# Patient Record
Sex: FEMALE | Race: WHITE | NOT HISPANIC OR LATINO | Employment: UNEMPLOYED | URBAN - METROPOLITAN AREA
[De-identification: names, ages, dates, MRNs, and addresses within clinical notes are randomized per-mention and may not be internally consistent; named-entity substitution may affect disease eponyms.]

---

## 2018-04-04 ENCOUNTER — TRANSCRIBE ORDERS (OUTPATIENT)
Dept: PHYSICAL THERAPY | Facility: CLINIC | Age: 38
End: 2018-04-04

## 2018-04-04 ENCOUNTER — EVALUATION (OUTPATIENT)
Dept: PHYSICAL THERAPY | Facility: CLINIC | Age: 38
End: 2018-04-04
Payer: COMMERCIAL

## 2018-04-04 DIAGNOSIS — M54.16 LUMBAR RADICULOPATHY: ICD-10-CM

## 2018-04-04 DIAGNOSIS — M53.3 SACROILIAC JOINT DYSFUNCTION: Primary | ICD-10-CM

## 2018-04-04 PROCEDURE — G8988 SELF CARE GOAL STATUS: HCPCS

## 2018-04-04 PROCEDURE — G8987 SELF CARE CURRENT STATUS: HCPCS

## 2018-04-04 PROCEDURE — 97140 MANUAL THERAPY 1/> REGIONS: CPT

## 2018-04-04 PROCEDURE — 97161 PT EVAL LOW COMPLEX 20 MIN: CPT

## 2018-04-04 RX ORDER — CYCLOBENZAPRINE HCL 10 MG
TABLET ORAL
COMMUNITY

## 2018-04-04 NOTE — LETTER
2018    MD Vandana Foley [phone]    Patient: Fariba Alan   YOB: 1980   Date of Visit: 2018     Encounter Diagnosis     ICD-10-CM    1  Sacroiliac joint dysfunction M53 3    2  Lumbar radiculopathy M54 16        Dear Dr Augusto Horan:    Please review the attached Plan of Care from [facility] recent visit  Please verify that you agree therapy should continue by signing the attached document and sending it back to our office  If you have any questions or concerns, please don't hesitate to call  Sincerely,    Fabby Mina, PT      Referring Provider:      I certify that I have read the below Plan of Care and certify the need for these services furnished under this plan of treatment while under my care  MD Vandana Foley [phone] [id number]  VIA Facsimile: [phone]          PT Evaluation     Today's date: 2018  Patient name: Fariba Alan  : 1980  MRN: [id number]  Referring provider: Brandi De Santiago MD  Dx:   Encounter Diagnosis     ICD-10-CM    1  Sacroiliac joint dysfunction M53 3    2  Lumbar radiculopathy M54 16        Start Time: 1300  Stop Time: 1400  Total time in clinic (min): 60 minutes    Assessment  Impairments: abnormal muscle firing, abnormal or restricted ROM, activity intolerance, impaired physical strength, lacks appropriate home exercise program and pain with function    Assessment details: Fariba Alan is a 44 y o  female who presents with pain, decreased strength, decreased ROM, decreased joint mobility and postural  dysfunction  Due to these impairments, patient has difficulty performing a/iadls, recreational activities and engaging in social activities  Patient's clinical presentation is consistent with their referring diagnosis of Lumbar radiculopathy  (primary encounter diagnosis)     Patient has been educated in home exercise program and plan of care  Patient would benefit from skilled physical therapy services to address their aforementioned functional limitations and progress towards prior level of function and independence with home exercise program    Understanding of Dx/Px/POC: good   Prognosis: good    Goals  Short Term Goals: Target Date 5/4/18  1  Pt will initiate and advance HEP  2  Pt will demonstrate improved standing posture  3  Pt will demonstrate (-) Elys  4  Pt will demonstrate increased AROM in L/S  Long Term Goals: Target Date 5/31/18  1  Pt will demonstrate independence in HEP  2  Pt will report abolished pain  3  Pt will demonstrate full AROM in L/S   4  Pt will demonstrate (-) Moses  Plan  Patient would benefit from: skilled PT  Planned modality interventions: cryotherapy, electrical stimulation/Russian stimulation and thermotherapy: hydrocollator packs  Planned therapy interventions: joint mobilization, manual therapy, patient education, postural training, activity modification, abdominal trunk stabilization, body mechanics training, flexibility, functional ROM exercises, graded exercise, home exercise program, neuromuscular re-education, strengthening, stretching, therapeutic activities, therapeutic exercise, motor coordination training, muscle pump exercises, gait training, balance/weight bearing training, ADL training and breathing training  Frequency: 2x week  Duration in weeks: 6  Treatment plan discussed with: patient        Subjective Evaluation    History of Present Illness  Date of onset: 12/31/2017  Mechanism of injury: Pt reports intermittent "flare ups" in L/S since August 2017  Pt reports original symptoms were managed with physical therapy  Pt reports she was able to continue with HEP from PT to manage symptoms  Pt reports most recent onset of pain in December 2017  Pt reports pain has continued to worsen noting she went to urgent care and chiropractic with minimal relief   Pt reports she sought Dr Arnaud Anguiano most recently where she was referred to OPPT  Recurrent probem    Quality of life: good    Pain  Current pain ratin  At best pain ratin  At worst pain ratin  Location: R L/S b/l hips, b/l thighs   Quality: cramping, tight and needle-like  Relieving factors: medications and rest  Aggravating factors: nothing  Progression: worsening    Social Support  Lives with: young children and spouse    Employment status: not working  Hand dominance: right  Exercise history: none currently   Life stress: mother of 3      Diagnostic Tests  X-ray: abnormal  MRI studies: abnormal  Treatments  Previous treatment: physical therapy, chiropractic and medication  Current treatment: chiropractic and medication  Patient Goals  Patient goals for therapy: decreased pain, increased motion and independence with ADLs/IADLs          Objective     Static Posture   General Observations  Asymmetrical weight bearing and shifted right  Scapulae  Right depressed  Palpation   Left   No palpable tenderness to the gluteus medius and piriformis  Hypertonic in the iliacus, iliopsoas and lumbar paraspinals  Muscle spasm in the erector spinae and iliacus  Tenderness of the quadratus lumborum  Right   No palpable tenderness to the gluteus medius and piriformis  Hypertonic in the erector spinae, iliacus, iliopsoas, lumbar paraspinals and quadratus lumborum  Muscle spasm in the iliacus  Tenderness of the lumbar paraspinals  Tenderness     Lumbar Spine  Tenderness in the spinous process       Active Range of Motion     Additional Active Range of Motion Details  L/S   Flexion decreased by 25%*  Extension decreased by 75%  Side Bending WFLs  b/l  Rotation WFLs b/l     Strength/Myotome Testing     Lumbar   Left   Normal strength  Heel walk: normal  Toe walk: normal    Right   Normal strength  Heel walk: normal  Toe walk: normal    Left Hip   Normal muscle strength  Planes of Motion   Extension: 4    Right Hip   Normal muscle strength  Planes of Motion   Extension: 4    Left Knee   Normal strength    Right Knee   Normal strength    Left Ankle/Foot   Normal strength    Right Ankle/Foot   Normal strength  Great toe extension: 4+    Tests     Lumbar     Left   Positive femoral stretch  Negative passive SLR  Right   Positive femoral stretch  Negative passive SLR  Left Hip   Positive Ely's  Moses: Positive  90/90 SLR: Positive  SLR: Negative  Right Hip   Moses: Positive  90/90 SLR: Positive  SLR: Negative  Additional Tests Details  (+) Long Sit test    Ambulation     Observational Gait   Gait: within functional limits     Functional Assessment   Squat   Left tibial anterior translation beyond toes and right tibial anterior translation beyond toes       Single Leg Stance   Left: 30 seconds  Right: 30 seconds    Comments  Squat b/l heel lift      Flowsheet Rows    Flowsheet Row Most Recent Value   PT/OT G-Codes   Current Score  33   Projected Score  52   FOTO information reviewed  Yes   Assessment Type  Evaluation   G code set  Self-Care   Self Care Current Status ()  CL   Self Care Goal Status ()  CK          Precautions: standard    Daily Treatment Diary     Manual  4/4/18            MET L hip ext 5x:06            STM L/S paraspinals  10 min            L/S taping x2                                          Exercise Diary  4/4/18            TVA 10x2            LTR 10x                                                                                                                                                                                                                                                          Modalities

## 2018-04-04 NOTE — PROGRESS NOTES
PT Evaluation     Today's date: 2018  Patient name: Manish Keita  : 1980  MRN: [id number]  Referring provider: Zach White MD  Dx:   Encounter Diagnosis     ICD-10-CM    1  Sacroiliac joint dysfunction M53 3    2  Lumbar radiculopathy M54 16        Start Time: 1300  Stop Time: 1400  Total time in clinic (min): 60 minutes    Assessment  Impairments: abnormal muscle firing, abnormal or restricted ROM, activity intolerance, impaired physical strength, lacks appropriate home exercise program and pain with function    Assessment details: Manish Keita is a 44 y o  female who presents with pain, decreased strength, decreased ROM, decreased joint mobility and postural  dysfunction  Due to these impairments, patient has difficulty performing a/iadls, recreational activities and engaging in social activities  Patient's clinical presentation is consistent with their referring diagnosis of Lumbar radiculopathy  (primary encounter diagnosis)    Patient has been educated in home exercise program and plan of care  Patient would benefit from skilled physical therapy services to address their aforementioned functional limitations and progress towards prior level of function and independence with home exercise program    Understanding of Dx/Px/POC: good   Prognosis: good    Goals  Short Term Goals: Target Date 18  1  Pt will initiate and advance HEP  2  Pt will demonstrate improved standing posture  3  Pt will demonstrate (-) Elys  4  Pt will demonstrate increased AROM in L/S  Long Term Goals: Target Date 18  1  Pt will demonstrate independence in HEP  2  Pt will report abolished pain  3  Pt will demonstrate full AROM in L/S   4  Pt will demonstrate (-) Moses         Plan  Patient would benefit from: skilled PT  Planned modality interventions: cryotherapy, electrical stimulation/Russian stimulation and thermotherapy: hydrocollator packs  Planned therapy interventions: joint mobilization, manual therapy, patient education, postural training, activity modification, abdominal trunk stabilization, body mechanics training, flexibility, functional ROM exercises, graded exercise, home exercise program, neuromuscular re-education, strengthening, stretching, therapeutic activities, therapeutic exercise, motor coordination training, muscle pump exercises, gait training, balance/weight bearing training, ADL training and breathing training  Frequency: 2x week  Duration in weeks: 6  Treatment plan discussed with: patient        Subjective Evaluation    History of Present Illness  Date of onset: 2017  Mechanism of injury: Pt reports intermittent "flare ups" in L/S since 2017  Pt reports original symptoms were managed with physical therapy  Pt reports she was able to continue with HEP from PT to manage symptoms  Pt reports most recent onset of pain in 2017  Pt reports pain has continued to worsen noting she went to urgent care and chiropractic with minimal relief  Pt reports she sought Dr [address] most recently where she was referred to OPPT     Recurrent probem    Quality of life: good    Pain  Current pain ratin  At best pain ratin  At worst pain ratin  Location: R L/S b/l hips, b/l thighs   Quality: cramping, tight and needle-like  Relieving factors: medications and rest  Aggravating factors: nothing  Progression: worsening    Social Support  Lives with: young children and spouse    Employment status: not working  Hand dominance: right  Exercise history: none currently   Life stress: mother of 3      Diagnostic Tests  X-ray: abnormal  MRI studies: abnormal  Treatments  Previous treatment: physical therapy, chiropractic and medication  Current treatment: chiropractic and medication  Patient Goals  Patient goals for therapy: decreased pain, increased motion and independence with ADLs/IADLs          Objective     Static Posture   General Observations  Asymmetrical weight bearing and shifted right  Scapulae  Right depressed  Palpation   Left   No palpable tenderness to the gluteus medius and piriformis  Hypertonic in the iliacus, iliopsoas and lumbar paraspinals  Muscle spasm in the erector spinae and iliacus  Tenderness of the quadratus lumborum  Right   No palpable tenderness to the gluteus medius and piriformis  Hypertonic in the erector spinae, iliacus, iliopsoas, lumbar paraspinals and quadratus lumborum  Muscle spasm in the iliacus  Tenderness of the lumbar paraspinals  Tenderness     Lumbar Spine  Tenderness in the spinous process  Active Range of Motion     Additional Active Range of Motion Details  L/S   Flexion decreased by 25%*  Extension decreased by 75%  Side Bending WFLs  b/l  Rotation WFLs b/l     Strength/Myotome Testing     Lumbar   Left   Normal strength  Heel walk: normal  Toe walk: normal    Right   Normal strength  Heel walk: normal  Toe walk: normal    Left Hip   Normal muscle strength  Planes of Motion   Extension: 4    Right Hip   Normal muscle strength  Planes of Motion   Extension: 4    Left Knee   Normal strength    Right Knee   Normal strength    Left Ankle/Foot   Normal strength    Right Ankle/Foot   Normal strength  Great toe extension: 4+    Tests     Lumbar     Left   Positive femoral stretch  Negative passive SLR  Right   Positive femoral stretch  Negative passive SLR  Left Hip   Positive Ely's  Moses: Positive  90/90 SLR: Positive  SLR: Negative  Right Hip   Moses: Positive  90/90 SLR: Positive  SLR: Negative  Additional Tests Details  (+) Long Sit test    Ambulation     Observational Gait   Gait: within functional limits     Functional Assessment   Squat   Left tibial anterior translation beyond toes and right tibial anterior translation beyond toes       Single Leg Stance   Left: 30 seconds  Right: 30 seconds    Comments  Squat b/l heel lift      Flowsheet Rows    Flowsheet Row Most Recent Value PT/OT G-Codes   Current Score  33   Projected Score  52   FOTO information reviewed  Yes   Assessment Type  Evaluation   G code set  Self-Care   Self Care Current Status ()  CL   Self Care Goal Status ()  CK          Precautions: standard    Daily Treatment Diary     Manual  4/4/18            MET L hip ext 5x:06            STM L/S paraspinals  10 min            L/S taping x2                                          Exercise Diary  4/4/18            TVA 10x2            LTR 10x                                                                                                                                                                                                                                                          Modalities

## 2018-04-09 ENCOUNTER — OFFICE VISIT (OUTPATIENT)
Dept: PHYSICAL THERAPY | Facility: CLINIC | Age: 38
End: 2018-04-09
Payer: COMMERCIAL

## 2018-04-09 DIAGNOSIS — M54.16 LUMBAR RADICULOPATHY: ICD-10-CM

## 2018-04-09 DIAGNOSIS — M53.3 SACROILIAC JOINT DYSFUNCTION: Primary | ICD-10-CM

## 2018-04-09 PROCEDURE — 97140 MANUAL THERAPY 1/> REGIONS: CPT

## 2018-04-09 PROCEDURE — 97110 THERAPEUTIC EXERCISES: CPT

## 2018-04-11 ENCOUNTER — OFFICE VISIT (OUTPATIENT)
Dept: PHYSICAL THERAPY | Facility: CLINIC | Age: 38
End: 2018-04-11
Payer: COMMERCIAL

## 2018-04-11 DIAGNOSIS — M53.3 SACROILIAC JOINT DYSFUNCTION: ICD-10-CM

## 2018-04-11 DIAGNOSIS — M54.16 LUMBAR RADICULOPATHY: Primary | ICD-10-CM

## 2018-04-11 PROCEDURE — 97110 THERAPEUTIC EXERCISES: CPT

## 2018-04-11 PROCEDURE — 97140 MANUAL THERAPY 1/> REGIONS: CPT

## 2018-04-11 NOTE — PROGRESS NOTES
Daily Note     Today's date: 2018  Patient name: Cele Plummer  : 1980  MRN: [id number]  Referring provider: Maurisio Campbell MD  Dx:   Encounter Diagnoses   Name Primary?  Lumbar radiculopathy Yes    Sacroiliac joint dysfunction        Start Time: 1100  Stop Time: 1205  Total time in clinic (min): 65 minutes    Subjective: Pt reports she reports increased pain since Monday, reports she woke up yesterday with R sided pain, reports muscles feel in spasm  Pt reports she had less help with her kids yesterday, however reports she was trying to be mindful of mechanics  Pain Ratin/10    Objective: See treatment diary below    There Ex  TVA isometric add/abd 10x:10 x 2  LTR 10x  Prone Prop 3 min  TVA alt ER 20x  Diaphragm Breathing     Manual  STM L/S paraspinals  MFR b/l QL  Sacral counter nutation 3x10  PA  T/S  MET R hip ext, L hip flex  Isometric hip abd//abd  Long axis traction  Pain taping on R L/S      Assessment: pt demonstrates high levels of anxiety throughout session,  Frustrated with pain levels  Pt reports LE spasms and UE symptoms  Pt encouraged with relaxation techniques to avoid stress  Pt reports decreased pain post treatment session  Pain Rating: 3/10      Plan: Continue per plan of care  Progress treatment as tolerated

## 2018-04-16 ENCOUNTER — OFFICE VISIT (OUTPATIENT)
Dept: PHYSICAL THERAPY | Facility: CLINIC | Age: 38
End: 2018-04-16
Payer: COMMERCIAL

## 2018-04-16 DIAGNOSIS — M53.3 SACROILIAC JOINT DYSFUNCTION: ICD-10-CM

## 2018-04-16 DIAGNOSIS — M54.16 LUMBAR RADICULOPATHY: Primary | ICD-10-CM

## 2018-04-16 PROCEDURE — 97110 THERAPEUTIC EXERCISES: CPT

## 2018-04-16 PROCEDURE — 97140 MANUAL THERAPY 1/> REGIONS: CPT

## 2018-04-16 NOTE — PROGRESS NOTES
Daily Note     Today's date: 2018  Patient name: Pietro Gallegos  : 1980  MRN: [id number]  Referring provider: Eber Aldrich MD  Dx:   Encounter Diagnoses   Name Primary?  Lumbar radiculopathy Yes    Sacroiliac joint dysfunction        Start Time: 1100  Stop Time: 1200  Total time in clinic (min): 60 minutes    Subjective: Pt reports she has been feeling ok since last therapy session, she took a muscle relaxor after last session, notes she has been compliant with HEP, saw chiropractor on Friday, has been feeling better overal     Pain Ratin-1/0    Objective: See treatment diary below    There Ex  TVA isometric add/abd 10x:05 x 2  TVA alt ER 20x  Isometric push/pull with foam roller 20x:05  TVA alt marching 20x  Clamshells 20x    Manual  STM L/S paraspinals  MFR b/l QL  Sacral counter nutation 3x10  PA  T/S  MET R hip ext, L hip flex  Isometric hip abd//abd  Manual quad stretching  taping on b/l paraspinals      Assessment: pt reports no provoked pain throughout treatment session, except for attempt at prone bird/dog  Pt given written HEP to continue stabilization strengthening  Pain Ratin//10      Plan: Continue per plan of care  Progress treatment as tolerated

## 2018-04-18 ENCOUNTER — OFFICE VISIT (OUTPATIENT)
Dept: PHYSICAL THERAPY | Facility: CLINIC | Age: 38
End: 2018-04-18
Payer: COMMERCIAL

## 2018-04-18 DIAGNOSIS — M54.16 LUMBAR RADICULOPATHY: Primary | ICD-10-CM

## 2018-04-18 DIAGNOSIS — M53.3 SACROILIAC JOINT DYSFUNCTION: ICD-10-CM

## 2018-04-18 PROCEDURE — 97140 MANUAL THERAPY 1/> REGIONS: CPT

## 2018-04-18 PROCEDURE — 97014 ELECTRIC STIMULATION THERAPY: CPT

## 2018-04-18 PROCEDURE — G0283 ELEC STIM OTHER THAN WOUND: HCPCS

## 2018-04-18 PROCEDURE — 97110 THERAPEUTIC EXERCISES: CPT

## 2018-04-18 NOTE — PROGRESS NOTES
Daily Note     Today's date: 2018  Patient name: Gloria Madrid  : 1980  MRN: [id number]  Referring provider: Lilly Robins MD  Dx:   Encounter Diagnoses   Name Primary?  Lumbar radiculopathy Yes    Sacroiliac joint dysfunction        Start Time: 1300  Stop Time: 1425  Total time in clinic (min): 85 minutes    Subjective: Pt reports waking this morning she felt increased pain, pt reports she began to feel heart race and anxiety about pain  Pt reports now she is shaking, pt reports she feels her heart racing  Pain Ratin/10    Objective: See treatment diary below    2 nurses from care now came into assess the patient HR 99 bpm, /85 mmHg, 100% pulse o2  RN reports likely anxiety  Pt able to work on breathing to relax in hooklying position to rest, notes anxiety and symptoms decreased  Pt given juice and water to drink, also able to eat Zbar  There Ex  TVA isometric add/abd 10x:05 x 2  Side Bending  Prone Prop    Manual   Sacral counter nutation 3x10  MET R hip ext, L hip flex  Isometric hip abd//abd  L/S gaping L1-5    Modalities  Moist Heat L/S skin intact pre/post  Premod L/S 15 minutes       Assessment: Pt notes increased pain with transfers, some relief with MET  Pt demonstrates high levels of concern secondary to symptoms  Pt is scheduled with PCP tomorrow  Pt reports decreased symptoms post treatment session  RN came back into check on patient,patient reports she is safe to drive home and will follow up with PCP tomorrow  Pain Ratin/10      Plan: Continue per plan of care  Progress treatment as tolerated

## 2018-04-23 ENCOUNTER — OFFICE VISIT (OUTPATIENT)
Dept: PHYSICAL THERAPY | Facility: CLINIC | Age: 38
End: 2018-04-23
Payer: COMMERCIAL

## 2018-04-23 DIAGNOSIS — M53.3 SACROILIAC JOINT DYSFUNCTION: Primary | ICD-10-CM

## 2018-04-23 DIAGNOSIS — M54.16 LUMBAR RADICULOPATHY: ICD-10-CM

## 2018-04-23 PROCEDURE — 97110 THERAPEUTIC EXERCISES: CPT

## 2018-04-23 PROCEDURE — 97140 MANUAL THERAPY 1/> REGIONS: CPT

## 2018-04-23 NOTE — PROGRESS NOTES
Daily Note     Today's date: 2018  Patient name: Cele Plummer  : 1980  MRN: [id number]  Referring provider: Maurisio Campbell MD  Dx:   Encounter Diagnoses   Name Primary? 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

## 2018-04-25 ENCOUNTER — OFFICE VISIT (OUTPATIENT)
Dept: PHYSICAL THERAPY | Facility: CLINIC | Age: 38
End: 2018-04-25
Payer: COMMERCIAL

## 2018-04-25 DIAGNOSIS — M54.16 LUMBAR RADICULOPATHY: ICD-10-CM

## 2018-04-25 DIAGNOSIS — M53.3 SACROILIAC JOINT DYSFUNCTION: Primary | ICD-10-CM

## 2018-04-25 PROCEDURE — 97110 THERAPEUTIC EXERCISES: CPT

## 2018-04-25 PROCEDURE — 97140 MANUAL THERAPY 1/> REGIONS: CPT

## 2018-04-30 ENCOUNTER — OFFICE VISIT (OUTPATIENT)
Dept: PHYSICAL THERAPY | Facility: CLINIC | Age: 38
End: 2018-04-30
Payer: COMMERCIAL

## 2018-04-30 DIAGNOSIS — M54.16 LUMBAR RADICULOPATHY: ICD-10-CM

## 2018-04-30 DIAGNOSIS — M53.3 SACROILIAC JOINT DYSFUNCTION: Primary | ICD-10-CM

## 2018-04-30 PROCEDURE — 97140 MANUAL THERAPY 1/> REGIONS: CPT

## 2018-04-30 PROCEDURE — 97110 THERAPEUTIC EXERCISES: CPT

## 2018-05-02 ENCOUNTER — OFFICE VISIT (OUTPATIENT)
Dept: PHYSICAL THERAPY | Facility: CLINIC | Age: 38
End: 2018-05-02
Payer: COMMERCIAL

## 2018-05-02 DIAGNOSIS — M53.3 SACROILIAC JOINT DYSFUNCTION: Primary | ICD-10-CM

## 2018-05-02 DIAGNOSIS — M54.16 LUMBAR RADICULOPATHY: ICD-10-CM

## 2018-05-02 PROCEDURE — 97110 THERAPEUTIC EXERCISES: CPT

## 2018-05-02 PROCEDURE — 97140 MANUAL THERAPY 1/> REGIONS: CPT

## 2018-05-02 NOTE — PROGRESS NOTES
Daily Note     Today's date: 2018  Patient name: Elizabeth Sweeney  : 1980  MRN: [id number]  Referring provider: Hugo Yanez MD  Dx:   Encounter Diagnoses   Name Primary?  Sacroiliac joint dysfunction Yes    Lumbar radiculopathy        Start Time: 1100  Stop Time: 1200  Total time in clinic (min): 60 minutes    Subjective: Pt reports some increased R shoulder and R T/S tightness yesterday and today  Pain Ratin/10    Objective: See treatment diary below    There Ex  Foam roll T/S and b/l quads/hip flexors  Diaphragmatic breathing 10x  Chin tuck head lift 10x :05   Open Books 10x  UT & Scalene stretch 2 x :30  LTR with yellow physioball 10x  Bridges on yellow physioball 2x10    Manual   Suboccipital release  c/s side glides  c/s traction  PA T/S  STM  b/l scalenes, LS, UT, rhomboids        Assessment: Pt demonstrates improved AROM in c/s post treatment session, given open books and LS stretch for home  Some tightness in R LS remains post treatment session  Pain Ratin/10      Plan: Continue per plan of care  Progress treatment as tolerated

## 2018-05-07 ENCOUNTER — OFFICE VISIT (OUTPATIENT)
Dept: PHYSICAL THERAPY | Facility: CLINIC | Age: 38
End: 2018-05-07
Payer: COMMERCIAL

## 2018-05-07 DIAGNOSIS — M53.3 SACROILIAC JOINT DYSFUNCTION: ICD-10-CM

## 2018-05-07 DIAGNOSIS — M54.16 LUMBAR RADICULOPATHY: Primary | ICD-10-CM

## 2018-05-07 PROCEDURE — 97140 MANUAL THERAPY 1/> REGIONS: CPT

## 2018-05-07 PROCEDURE — G8988 SELF CARE GOAL STATUS: HCPCS

## 2018-05-07 PROCEDURE — G8987 SELF CARE CURRENT STATUS: HCPCS

## 2018-05-07 PROCEDURE — 97110 THERAPEUTIC EXERCISES: CPT

## 2018-05-07 NOTE — LETTER
May 9, 2018    MD Vandana Gallagher [phone]    Patient: Nan Levin   YOB: 1980   Date of Visit: 2018     Encounter Diagnosis     ICD-10-CM    1  Lumbar radiculopathy M54 16    2  Sacroiliac joint dysfunction M53 3        Dear Dr Jean Johnson:    Please review the attached Plan of Care from [facility] recent visit  Please verify that you agree therapy should continue by signing the attached document and sending it back to our office  If you have any questions or concerns, please don't hesitate to call  Sincerely,    Roma Pedersen, PT      Referring Provider:      I certify that I have read the below Plan of Care and certify the need for these services furnished under this plan of treatment while under my care  MD Vandana Gallagher [address] [phone]          PT Re-Evaluation     Today's date: 2018  Patient name: Nan Levin  : 1980  MRN: [id number]  Referring provider: Evelin Gonsalez MD  Dx:   Encounter Diagnosis     ICD-10-CM    1  Lumbar radiculopathy M54 16    2  Sacroiliac joint dysfunction M53 3        Start Time: 1100  Stop Time: 1215  Total time in clinic (min): 75 minutes    Assessment  Impairments: abnormal muscle firing, abnormal or restricted ROM, activity intolerance, impaired physical strength, lacks appropriate home exercise program and pain with function    Assessment details: Nan Levin has received 10 visits of physical therapy and reports 50 % improvement since initial evaluation  Pt demonstrates improved pain, increased ROM and improved posture , and reports improvement with I/ADLs  Pt continues to demonstrate pain, decreased strength, decreased ROM, decreased joint mobility and postural  dysfunction   Due to these remaining impairments, patient continues to have difficulty performing a/iadls and recreational activities  Patient would benefit from continued skilled physical therapy services to address their aforementioned functional limitations and and continue to progress towards prior level of function and independence with home exercise program      Understanding of Dx/Px/POC: good   Prognosis: good    Goals  Short Term Goals: Target Date 5/4/18  1  Pt will initiate and advance HEP  (achieved)   2  Pt will demonstrate improved standing posture  (achieved)  3  Pt will demonstrate (-) Elys  (progressing towards)  4  Pt will demonstrate increased AROM in L/S  (achieved)      Long Term Goals: Target Date 5/31/18 (progressing towards)  1  Pt will demonstrate independence in HEP  2  Pt will report abolished pain  3  Pt will demonstrate full AROM in L/S   4  Pt will demonstrate (-) Moses  Plan  Patient would benefit from: skilled PT  Planned modality interventions: cryotherapy, electrical stimulation/Russian stimulation and thermotherapy: hydrocollator packs  Planned therapy interventions: joint mobilization, manual therapy, patient education, postural training, activity modification, abdominal trunk stabilization, body mechanics training, flexibility, functional ROM exercises, graded exercise, home exercise program, neuromuscular re-education, strengthening, stretching, therapeutic activities, therapeutic exercise, motor coordination training, muscle pump exercises, gait training, balance/weight bearing training, ADL training and breathing training  Frequency: 2x week  Duration in weeks: 4  Treatment plan discussed with: patient        Subjective Evaluation    History of Present Illness  Date of onset: 12/31/2017  Mechanism of injury: Pt reports intermittent "flare ups" in L/S since August 2017  Pt reports original symptoms were managed with physical therapy  Pt reports she was able to continue with HEP from PT to manage symptoms  Pt reports most recent onset of pain in December 2017   Pt reports pain has continued to worsen noting she went to urgent care and chiropractic with minimal relief  Pt reports she sought Dr Jaclyn Kaufman most recently where she was referred to OPPT  Recurrent probem    Quality of life: good    Pain  Current pain ratin  At best pain ratin  At worst pain ratin  Location: R L/S b/l hips, b/l SI  Quality: tight, pulling and discomfort  Relieving factors: medications and rest  Aggravating factors: nothing  Progression: improved    Social Support  Lives with: young children and spouse    Employment status: not working  Hand dominance: right  Exercise history: none currently, reports compliance with HEP  Life stress: mother of 3      Diagnostic Tests  X-ray: abnormal  MRI studies: abnormal  Treatments  Previous treatment: physical therapy, chiropractic and medication  Current treatment: chiropractic and medication  Patient Goals  Patient goals for therapy: decreased pain, increased motion and independence with ADLs/IADLs (progressing towards)  Patient goal: Pt notes decreased pain, improved ROM  Objective     Static Posture     Scapulae  Right depressed  Palpation   Left   No palpable tenderness to the erector spinae and quadratus lumborum  Hypertonic in the iliacus and iliopsoas  Tenderness of the gluteus medius and piriformis  Right   Hypertonic in the erector spinae, iliacus, iliopsoas, lumbar paraspinals and quadratus lumborum  Tenderness of the gluteus medius, lumbar paraspinals and piriformis  Tenderness     Lumbar Spine  Tenderness in the spinous process       Active Range of Motion     Additional Active Range of Motion Details  L/S   Flexion WFLs  Extension decreased by 25%  Side Bending WFLs  b/l  Rotation WFLs b/l     Strength/Myotome Testing     Lumbar   Left   Heel walk: normal  Toe walk: normal    Right   Heel walk: normal  Toe walk: normal    Left Hip   Planes of Motion   Extension: 4    Right Hip   Planes of Motion   Extension: 4    Left Knee   Normal strength    Right Knee   Normal strength  Prone flexion: 2    Left Ankle/Foot   Normal strength    Right Ankle/Foot   Normal strength  Great toe extension: 4+    Tests     Lumbar     Left   Positive femoral stretch  Negative passive SLR  Right   Positive femoral stretch  Negative passive SLR  Left Hip   Positive Ely's  Moses: Positive  90/90 SLR: Positive  SLR: Negative  Right Hip   Moses: Positive  90/90 SLR: Positive  SLR: Negative  Additional Tests Details  (+) Long Sit test    Ambulation     Observational Gait   Gait: within functional limits     Functional Assessment   Squat No left tibial anterior translation beyond toes and no right tibial anterior translation beyond toes       Single Leg Stance   Left: 30 seconds  Right: 30 seconds    Comments  Squat b/l heel lift      Flowsheet Rows      Most Recent Value   PT/OT G-Codes   Current Score  58   Projected Score  52   FOTO information reviewed  Yes   Assessment Type  Re-evaluation   G code set  Self-Care   Self Care Current Status ()  CK   Self Care Goal Status ()  CK          Precautions: standard    Daily Treatment Diary     Manual  5/7/18            MET L hip ext 5x:06            STM L/S paraspinals  5 min            Piriformis and glut med release 5 min                                          Exercise Diary  5/7/18            TVA 20x:05            LTR 10x            TVA push pull 20x:05            Couch Stretch 3x:60            TVA add/abd 20x:05                                                                                                                                                                                                                   Modalities

## 2018-05-07 NOTE — PROGRESS NOTES
PT Re-Evaluation     Today's date: 2018  Patient name: Anni Beach  : 1980  MRN: [id number]  Referring provider: Saranya Calderon MD  Dx:   Encounter Diagnosis     ICD-10-CM    1  Lumbar radiculopathy M54 16    2  Sacroiliac joint dysfunction M53 3        Start Time: 1100  Stop Time: 1215  Total time in clinic (min): 75 minutes    Assessment  Impairments: abnormal muscle firing, abnormal or restricted ROM, activity intolerance, impaired physical strength, lacks appropriate home exercise program and pain with function    Assessment details: Anni Beach has received 10 visits of physical therapy and reports 50 % improvement since initial evaluation  Pt demonstrates improved pain, increased ROM and improved posture , and reports improvement with I/ADLs  Pt continues to demonstrate pain, decreased strength, decreased ROM, decreased joint mobility and postural  dysfunction  Due to these remaining impairments, patient continues to have difficulty performing a/iadls and recreational activities  Patient would benefit from continued skilled physical therapy services to address their aforementioned functional limitations and and continue to progress towards prior level of function and independence with home exercise program      Understanding of Dx/Px/POC: good   Prognosis: good    Goals  Short Term Goals: Target Date 18  1  Pt will initiate and advance HEP  (achieved)   2  Pt will demonstrate improved standing posture  (achieved)  3  Pt will demonstrate (-) Elys  (progressing towards)  4  Pt will demonstrate increased AROM in L/S  (achieved)      Long Term Goals: Target Date 18 (progressing towards)  1  Pt will demonstrate independence in HEP  2  Pt will report abolished pain  3  Pt will demonstrate full AROM in L/S   4  Pt will demonstrate (-) Moses         Plan  Patient would benefit from: skilled PT  Planned modality interventions: cryotherapy, electrical stimulation/Chinese stimulation and thermotherapy: hydrocollator packs  Planned therapy interventions: joint mobilization, manual therapy, patient education, postural training, activity modification, abdominal trunk stabilization, body mechanics training, flexibility, functional ROM exercises, graded exercise, home exercise program, neuromuscular re-education, strengthening, stretching, therapeutic activities, therapeutic exercise, motor coordination training, muscle pump exercises, gait training, balance/weight bearing training, ADL training and breathing training  Frequency: 2x week  Duration in weeks: 4  Treatment plan discussed with: patient        Subjective Evaluation    History of Present Illness  Date of onset: 2017  Mechanism of injury: Pt reports intermittent "flare ups" in L/S since 2017  Pt reports original symptoms were managed with physical therapy  Pt reports she was able to continue with HEP from PT to manage symptoms  Pt reports most recent onset of pain in 2017  Pt reports pain has continued to worsen noting she went to urgent care and chiropractic with minimal relief  Pt reports she sought Dr Yumiko Mendoza most recently where she was referred to OPPT     Recurrent probem    Quality of life: good    Pain  Current pain ratin  At best pain ratin  At worst pain ratin  Location: R L/S b/l hips, b/l SI  Quality: tight, pulling and discomfort  Relieving factors: medications and rest  Aggravating factors: nothing  Progression: improved    Social Support  Lives with: young children and spouse    Employment status: not working  Hand dominance: right  Exercise history: none currently, reports compliance with HEP  Life stress: mother of 3      Diagnostic Tests  X-ray: abnormal  MRI studies: abnormal  Treatments  Previous treatment: physical therapy, chiropractic and medication  Current treatment: chiropractic and medication  Patient Goals  Patient goals for therapy: decreased pain, increased motion and independence with ADLs/IADLs (progressing towards)  Patient goal: Pt notes decreased pain, improved ROM  Objective     Static Posture     Scapulae  Right depressed  Palpation   Left   No palpable tenderness to the erector spinae and quadratus lumborum  Hypertonic in the iliacus and iliopsoas  Tenderness of the gluteus medius and piriformis  Right   Hypertonic in the erector spinae, iliacus, iliopsoas, lumbar paraspinals and quadratus lumborum  Tenderness of the gluteus medius, lumbar paraspinals and piriformis  Tenderness     Lumbar Spine  Tenderness in the spinous process  Active Range of Motion     Additional Active Range of Motion Details  L/S   Flexion WFLs  Extension decreased by 25%  Side Bending WFLs  b/l  Rotation WFLs b/l     Strength/Myotome Testing     Lumbar   Left   Heel walk: normal  Toe walk: normal    Right   Heel walk: normal  Toe walk: normal    Left Hip   Planes of Motion   Extension: 4    Right Hip   Planes of Motion   Extension: 4    Left Knee   Normal strength    Right Knee   Normal strength  Prone flexion: 2    Left Ankle/Foot   Normal strength    Right Ankle/Foot   Normal strength  Great toe extension: 4+    Tests     Lumbar     Left   Positive femoral stretch  Negative passive SLR  Right   Positive femoral stretch  Negative passive SLR  Left Hip   Positive Ely's  Moses: Positive  90/90 SLR: Positive  SLR: Negative  Right Hip   Moses: Positive  90/90 SLR: Positive  SLR: Negative  Additional Tests Details  (+) Long Sit test    Ambulation     Observational Gait   Gait: within functional limits     Functional Assessment   Squat No left tibial anterior translation beyond toes and no right tibial anterior translation beyond toes       Single Leg Stance   Left: 30 seconds  Right: 30 seconds    Comments  Squat b/l heel lift      Flowsheet Rows      Most Recent Value   PT/OT G-Codes   Current Score  58   Projected Score  52   FOTO information reviewed  Yes   Assessment Type  Re-evaluation   G code set  Self-Care   Self Care Current Status ()  CK   Self Care Goal Status ()  CK          Precautions: standard    Daily Treatment Diary     Manual  5/7/18            MET L hip ext 5x:06            STM L/S paraspinals  5 min            Piriformis and glut med release 5 min                                          Exercise Diary  5/7/18            TVA 20x:05            LTR 10x            TVA push pull 20x:05            Couch Stretch 3x:60            TVA add/abd 20x:05                                                                                                                                                                                                                   Modalities

## 2018-05-09 ENCOUNTER — OFFICE VISIT (OUTPATIENT)
Dept: PHYSICAL THERAPY | Facility: CLINIC | Age: 38
End: 2018-05-09
Payer: COMMERCIAL

## 2018-05-09 ENCOUNTER — TRANSCRIBE ORDERS (OUTPATIENT)
Dept: PHYSICAL THERAPY | Facility: CLINIC | Age: 38
End: 2018-05-09

## 2018-05-09 DIAGNOSIS — M53.3 SACROILIAC JOINT DYSFUNCTION: ICD-10-CM

## 2018-05-09 DIAGNOSIS — M54.16 LUMBAR RADICULOPATHY: ICD-10-CM

## 2018-05-09 DIAGNOSIS — M54.16 LUMBAR RADICULOPATHY: Primary | ICD-10-CM

## 2018-05-09 DIAGNOSIS — M53.3 SACROILIAC JOINT DYSFUNCTION: Primary | ICD-10-CM

## 2018-05-09 PROCEDURE — 97140 MANUAL THERAPY 1/> REGIONS: CPT

## 2018-05-09 PROCEDURE — 97110 THERAPEUTIC EXERCISES: CPT

## 2018-05-09 NOTE — PROGRESS NOTES
Daily Note     Today's date: 2018  Patient name: Edie Freeman  : 1980  MRN: [id number]  Referring provider: Kal Sanchez MD  Dx:   Encounter Diagnoses   Name Primary?  Lumbar radiculopathy Yes    Sacroiliac joint dysfunction        Start Time: 1100  Stop Time: 1145  Total time in clinic (min): 45 minutes    Subjective: Pt reports she is feeling ok, was able to perform HEP  Pain Ratin-2/10    Objective: See treatment diary below    There Ex  Bridges 2 x 10  Clamshells 2x10  TVA push/pull 20x  Prone Press Ups 10x  Heel tap 20x    Manual   MET L hip ext  STM b/l L/S paraspinals  MFR b/l QLs  PA T/S  Prone quad stretch       Assessment:  Pt reports some R sided discomfort post press ups, abolished when standing  Pain Ratin/10      Plan: Continue per plan of care  Progress treatment as tolerated

## 2018-05-14 ENCOUNTER — OFFICE VISIT (OUTPATIENT)
Dept: PHYSICAL THERAPY | Facility: CLINIC | Age: 38
End: 2018-05-14
Payer: COMMERCIAL

## 2018-05-14 DIAGNOSIS — M53.3 SACROILIAC JOINT DYSFUNCTION: ICD-10-CM

## 2018-05-14 DIAGNOSIS — M54.16 LUMBAR RADICULOPATHY: Primary | ICD-10-CM

## 2018-05-14 PROCEDURE — 97140 MANUAL THERAPY 1/> REGIONS: CPT

## 2018-05-14 NOTE — PROGRESS NOTES
Daily Note     Today's date: 2018  Patient name: Edie Freeman  : 1980  MRN: [id number]  Referring provider: Kal Sanchez MD  Dx:   Encounter Diagnoses   Name Primary?  Lumbar radiculopathy Yes    Sacroiliac joint dysfunction        Start Time:   Stop Time:   Total time in clinic (min): 45 minutes    Subjective: Pt reports some L/S discomfort, reports she saw chiropractor Friday, reports she feels tight in L/S  Pain Ratin-2/10    Objective: See treatment diary below    There Ex  Couch Stretch 2 x :30  Figure 4 stretch 3 x :30    Manual   MET L hip ext  STM b/l L/S paraspinals  MFR b/l QLs  PA T/S  MWM b/l piriformis       Assessment:  Pt reports no pain post treatment session, given HEP of couch stretch  Pain Ratin/10      Plan: Continue per plan of care  Progress treatment as tolerated

## 2018-05-16 ENCOUNTER — OFFICE VISIT (OUTPATIENT)
Dept: PHYSICAL THERAPY | Facility: CLINIC | Age: 38
End: 2018-05-16
Payer: COMMERCIAL

## 2018-05-16 DIAGNOSIS — M53.3 SACROILIAC JOINT DYSFUNCTION: Primary | ICD-10-CM

## 2018-05-16 DIAGNOSIS — M54.16 LUMBAR RADICULOPATHY: ICD-10-CM

## 2018-05-16 PROCEDURE — 97110 THERAPEUTIC EXERCISES: CPT

## 2018-05-16 PROCEDURE — 97140 MANUAL THERAPY 1/> REGIONS: CPT

## 2018-05-16 NOTE — PROGRESS NOTES
Daily Note     Today's date: 2018  Patient name: Fely Hyatt  : 1980  MRN: [id number]  Referring provider: Carlton Gray MD  Dx:   Encounter Diagnoses   Name Primary?  Sacroiliac joint dysfunction Yes    Lumbar radiculopathy        Start Time:   Stop Time:   Total time in clinic (min): 60 minutes    Subjective: Pt reports she saw craniosacral therapist yesterday, reports she feels very relaxed after session, however reports she is tight along R L/S  Pain Rating: 3/10    Objective: See treatment diary below    There Ex  Couch Stretch 2 x :30  Figure 4 stretch 3 x :30  Clamshells 2 x 10 yellow tband  Bridges with hip abd 20x  Bridges with hip add 20x  Dead Bug 20x  Heel Taps 20x  Chin tuck head lift 10x:10  Push Pull 20x :05    Manual   MET L hip ext, R hip flexion      Assessment:  Pt reports she feels some tightness remain along R glut, will continue HEP until next therapy session  Pain Ratin/10      Plan: Continue per plan of care  Progress treatment as tolerated

## 2018-05-21 ENCOUNTER — OFFICE VISIT (OUTPATIENT)
Dept: PHYSICAL THERAPY | Facility: CLINIC | Age: 38
End: 2018-05-21
Payer: COMMERCIAL

## 2018-05-21 DIAGNOSIS — M54.16 LUMBAR RADICULOPATHY: ICD-10-CM

## 2018-05-21 DIAGNOSIS — M53.3 SACROILIAC JOINT DYSFUNCTION: Primary | ICD-10-CM

## 2018-05-21 PROCEDURE — 97140 MANUAL THERAPY 1/> REGIONS: CPT

## 2018-05-21 PROCEDURE — 97110 THERAPEUTIC EXERCISES: CPT

## 2018-05-21 NOTE — PROGRESS NOTES
Daily Note     Today's date: 2018  Patient name: Fariba Alan  : 1980  MRN: [id number]  Referring provider: Brandi De Santiago MD  Dx:   Encounter Diagnoses   Name Primary?  Sacroiliac joint dysfunction Yes    Lumbar radiculopathy        Start Time: 1130  Stop Time: 5578  Total time in clinic (min): 50 minutes    Subjective: Pt reports she had a bad day on Saturday without reason, reports L/S pain most of the day  Pt reports she has been performing HEP, reports she saw chiropractor on Friday  Pt reports Saturday and today she is feeling much better  Pain Ratin/10    Objective: See treatment diary below    There Ex  Couch Stretch 2 x :30  Figure 4 stretch 3 x :30  Bridges with hip abd 20x  Bridges with hip add 20x  Side stepping red tband 2 x 10  Pallof Press 5x     Manual   MET L hip ext, R hip flexion  STM b/l L/S paraspinals  MWM b/l piriformis  PA T/S      Assessment:  Pt demonstrates increased tightness R > L in paraspinals and piriformis, decreased post treatment session  Pain Ratin/10      Plan: Continue per plan of care  Progress treatment as tolerated

## 2018-05-23 ENCOUNTER — APPOINTMENT (OUTPATIENT)
Dept: PHYSICAL THERAPY | Facility: CLINIC | Age: 38
End: 2018-05-23
Payer: COMMERCIAL

## 2018-05-30 ENCOUNTER — APPOINTMENT (OUTPATIENT)
Dept: PHYSICAL THERAPY | Facility: CLINIC | Age: 38
End: 2018-05-30
Payer: COMMERCIAL

## 2018-05-31 ENCOUNTER — OFFICE VISIT (OUTPATIENT)
Dept: PHYSICAL THERAPY | Facility: CLINIC | Age: 38
End: 2018-05-31
Payer: COMMERCIAL

## 2018-05-31 DIAGNOSIS — M53.3 SACROILIAC JOINT DYSFUNCTION: Primary | ICD-10-CM

## 2018-05-31 DIAGNOSIS — M54.16 LUMBAR RADICULOPATHY: ICD-10-CM

## 2018-05-31 PROCEDURE — 97140 MANUAL THERAPY 1/> REGIONS: CPT

## 2018-05-31 PROCEDURE — 97110 THERAPEUTIC EXERCISES: CPT

## 2018-05-31 NOTE — PROGRESS NOTES
Daily Note     Today's date: 2018  Patient name: Remington Stringer  : 1980  MRN: [id number]  Referring provider: Lauren Ribeiro MD  Dx:   Encounter Diagnoses   Name Primary?  Sacroiliac joint dysfunction Yes    Lumbar radiculopathy        Start Time: 1130  Stop Time: 1230  Total time in clinic (min): 60 minutes    Subjective: Pt reports she has been able to perform HEP since last therapy session, overall notes symptoms have been seldom  Pain Ratin/10    Objective: See treatment diary below    There Ex  Figure 4 stretch 3 x :30  Side stepping red tband 3 x 10  Quadruped alt hip ext 10x  Dead Bug 20x  Eagle Stretch   Foal ROller    Manual   STM b/l L/S paraspinals  MWM b/l piriformis  PA T/S      Assessment:  Pt demonstrates compliance with HEP, given written instructions for HEP, will follow up in 2 week for re-evaluation potential discharge  Pain Ratin/10      Plan: Continue per plan of care  Progress treatment as tolerated

## 2018-06-05 ENCOUNTER — APPOINTMENT (OUTPATIENT)
Dept: PHYSICAL THERAPY | Facility: CLINIC | Age: 38
End: 2018-06-05
Payer: COMMERCIAL

## 2018-06-13 ENCOUNTER — OFFICE VISIT (OUTPATIENT)
Dept: PHYSICAL THERAPY | Facility: CLINIC | Age: 38
End: 2018-06-13
Payer: COMMERCIAL

## 2018-06-13 DIAGNOSIS — M54.16 LUMBAR RADICULOPATHY: ICD-10-CM

## 2018-06-13 DIAGNOSIS — M53.3 SACROILIAC JOINT DYSFUNCTION: Primary | ICD-10-CM

## 2018-06-13 PROCEDURE — G8983 BODY POS D/C STATUS: HCPCS

## 2018-06-13 PROCEDURE — 97110 THERAPEUTIC EXERCISES: CPT

## 2018-06-13 PROCEDURE — G8982 BODY POS GOAL STATUS: HCPCS

## 2018-06-13 PROCEDURE — 97140 MANUAL THERAPY 1/> REGIONS: CPT

## 2018-06-13 NOTE — PROGRESS NOTES
PT Discharge    Today's date: 2018  Patient name: Celestino Shane  : 1980  MRN: [id number]  Referring provider: Zabrina Allison MD  Dx:   Encounter Diagnosis     ICD-10-CM    1  Sacroiliac joint dysfunction M53 3    2  Lumbar radiculopathy M54 16        Start Time: 1145  Stop Time: 1230  Total time in clinic (min): 45 minutes    Assessment    Assessment details: Celestino Shane has received 16 visits of physical therapy and reports 80 % improvement since initial evaluation  Pt demonstrates improved pain, increased strength, increased ROM, increased joint mobility and improved posture , and reports improvement with I/ADLs and sleep  Pt has achieved 6 goals and will benefit from continued HEP to progress and maintain functional goals/gains  Understanding of Dx/Px/POC: good   Prognosis: good    Goals  Short Term Goals: Target Date 18  1  Pt will initiate and advance HEP  (achieved)   2  Pt will demonstrate improved standing posture  (achieved)  3  Pt will demonstrate (-) Elys  (achieved)  4  Pt will demonstrate increased AROM in L/S  (achieved)      Long Term Goals: Target Date 18   1  Pt will demonstrate independence in HEP  (achieved)  2  Pt will report abolished pain  (progressing towards)  3  Pt will demonstrate full AROM in L/S  (achieved)  4  Pt will demonstrate (-) Moses  (partially achieved)      Plan  Plan details: Pt is discharged with progressive HEP  Subjective Evaluation    History of Present Illness  Date of onset: 2017  Mechanism of injury: Pt reports intermittent "flare ups" in L/S since 2017  Pt reports original symptoms were managed with physical therapy  Pt reports she was able to continue with HEP from PT to manage symptoms  Pt reports most recent onset of pain in 2017  Pt reports pain has continued to worsen noting she went to urgent care and chiropractic with minimal relief   Pt reports she sought Dr Margo Elise most recently where she was referred to [facility]PP[facility]  18  Pt has received 16 sessions of physical therapy and reports significant progress since initial evaluation  Pt reports she has had intermittent L/S pain after increased activity, however was able to perform HEP and resolve symptoms  Pt reports she does not feel limited with ADLs/IADLs  Recurrent probem    Quality of life: good    Pain  Current pain ratin  At best pain ratin  At worst pain ratin  Location: L/S  Quality: tight and dull ache  Relieving factors: rest  Aggravating factors: nothing  Progression: improved    Social Support  Lives with: young children and spouse    Employment status: not working  Hand dominance: right  Exercise history: none currently, reports compliance with HEP  Life stress: mother of 3      Diagnostic Tests  X-ray: abnormal  MRI studies: abnormal  Treatments  Previous treatment: physical therapy, chiropractic and medication  Current treatment: chiropractic and medication  Patient Goals  Patient goals for therapy: decreased pain, increased motion and independence with ADLs/IADLs (achieved)  Patient goal: Pt notes decreased pain, improved ROM  Objective     Static Posture     Scapulae  Right depressed  Palpation   Left   No palpable tenderness to the iliacus, iliopsoas, piriformis and quadratus lumborum  Hypertonic in the iliacus and iliopsoas  Tenderness of the gluteus medius  Right   No palpable tenderness to the erector spinae, iliacus, iliopsoas, piriformis and quadratus lumborum  Hypertonic in the iliacus, iliopsoas and lumbar paraspinals  Tenderness of the gluteus medius and lumbar paraspinals  Tenderness     Lumbar Spine  No tenderness in the spinous process       Active Range of Motion     Additional Active Range of Motion Details  L/S   Flexion WFLs  ExtensionWFLs  Side Bending WFLs  b/l  Rotation WFLs b/l     Strength/Myotome Testing     Lumbar   Left   Heel walk: normal  Toe walk: normal    Right   Heel walk: normal  Toe walk: normal    Left Hip   Planes of Motion   Extension: 5    Right Hip   Planes of Motion   Extension: 5    Left Knee   Normal strength    Right Knee   Normal strength    Left Ankle/Foot   Normal strength    Right Ankle/Foot   Normal strength  Great toe extension: 4+    Tests     Lumbar     Left   Negative femoral stretch and passive SLR  Right   Negative femoral stretch and passive SLR  Left Hip   Negative Ely's  Moses: Negative  90/90 SLR: Negative  SLR: Negative  Right Hip   Moses: Positive  90/90 SLR: Negative  SLR: Negative  Additional Tests Details  (-) Long Sit test    Ambulation     Observational Gait   Gait: within functional limits     Functional Assessment   Squat No left tibial anterior translation beyond toes and no right tibial anterior translation beyond toes       Single Leg Stance   Left: 30 seconds  Right: 30 seconds    Comments  Squat b/l heel lift, able to correct when instructed       Flowsheet Rows      Most Recent Value   PT/OT G-Codes   Current Score  70   Projected Score  52   FOTO information reviewed  Yes   Assessment Type  Discharge   G code set  Self-Care   Changing and Maintaining Body Position Goal Status ()  CJ   Changing and Maintaining Body Position Discharge Status ()  CJ          Precautions: standard    Daily Treatment Diary     Manual  6/13                         P/A T/S 3x10            Piriformis and glut med release 5 min                                          Exercise Diary  6/13            Bird/Dog 20x            Plank 1 min            Couch Stretch 3x:60            Dead Bug 20x            Review of HEP                                                                                                                                                                                                                    Modalities

## 2018-06-20 ENCOUNTER — APPOINTMENT (OUTPATIENT)
Dept: PHYSICAL THERAPY | Facility: CLINIC | Age: 38
End: 2018-06-20
Payer: COMMERCIAL

## 2018-06-27 ENCOUNTER — APPOINTMENT (OUTPATIENT)
Dept: PHYSICAL THERAPY | Facility: CLINIC | Age: 38
End: 2018-06-27
Payer: COMMERCIAL

## 2023-08-21 NOTE — PROGRESS NOTES
Daily Note     Today's date: 2018  Patient name: Anni Beach  : 1980  MRN: [id number]  Referring provider: Saranya Calderon MD  Dx:   Encounter Diagnoses   Name Primary?  Sacroiliac joint dysfunction Yes    Lumbar radiculopathy        Start Time: 1100  Stop Time: 1200  Total time in clinic (min): 60 minutes    Subjective: Pt reports she has been feeling good overall, reports she continues to take medication  Pt reports she saw chiropractor on Friday, and reports c/s and L/S were adjusted  Pt reports minimal L sided tightness this AM  Pain Ratin10    Objective: See treatment diary below    There Ex  TVA isometric add/abd 10x:05 x 2  Push Pull TVA 20x :05 hold  TVA alt hip flexion 2 x 10  Diaphragmatic breathing 10x  Chin tuck head lift 10x :05   90/90 TVA alt ext 2 x 10  Clamshells 20x yellow tband   b/l piriformis stretch   b/l HS stretch with SOS    Manual   (-) long sit  MWM b/l piriformis   MFR b/l QL  Sacral counter nutation mob 3x10   STM b/l gluts        Assessment: Pt reports some glut tightness with STM, decreased post treatment session  Pt given written HEP  Pain Ratin/10      Plan: Continue per plan of care  Progress treatment as tolerated  Oral Minoxidil Pregnancy And Lactation Text: This medication should only be used when clearly needed if you are pregnant, attempting to become pregnant or breast feeding.

## 2024-01-17 NOTE — PROGRESS NOTES
Daily Note     Today's date: 2018  Patient name: Fide Roldan  : 1980  MRN: [id number]  Referring provider: Olvin Hunter MD  Dx:   Encounter Diagnoses   Name Primary?  Lumbar radiculopathy     Sacroiliac joint dysfunction Yes       Start Time: 1100  Stop Time: 1200  Total time in clinic (min): 60 minutes    Subjective: Pt reports she has felt "okay" since initial evaluation, no c/o of increased pain  Pain Ratin/10    Objective: See treatment diary below    There Ex  TVA isometric add/abd 10x:10  LTR 10x  Prone Press Up 3x    Manual  STM L/S paraspinals  MFR b/l QL  Sacral counter nutation 3x10  PA L/S and T/S  MWM b/l piriformis  MET L hip ext  Isometric hip abd//abd  Long axis traction  B/l L/S paraspinal taping   Manual hip flexor stretch     Assessment: pt notes increased R L/S pain after hip flexor stretch, notes decreased pain post treatment session, given TVA and isometric hip add/abd for HEP  Pain Ratin/10      Plan: Continue per plan of care  Progress treatment as tolerated 
independent

## 2024-05-03 NOTE — PROGRESS NOTES
Daily Note     Today's date: 2018  Patient name: Celestino Shane  : 1980  MRN: [id number]  Referring provider: Zabrina Allison MD  Dx:   Encounter Diagnoses   Name Primary?  Sacroiliac joint dysfunction Yes    Lumbar radiculopathy        Start Time: 1100  Stop Time: 1145  Total time in clinic (min): 45 minutes    Subjective: Pt reports since Monday she has been feeling ok, did not sleep well last night, reports minimal to no pain along T/S and L/S  Pain Ratin/10    Objective: See treatment diary below    There Ex  TVA isometric add/abd 10x:05 x 2  Push Pull TVA 20x :05 hold  TVA alt hip flexion 2 x 10  Diaphragmatic breathing 10x  Chin tuck head lift 10x :05   90/90 TVA alt ext 2 x 5  Clamshells 20x  Manual   (+) long sit  MFR b/l QL  Quick Stretch to diaphragm   P/A T/S  Sacral counter nutation mob   The stick b/l quads       Assessment: Pt reports no pain post treatment session, given HEP of chin tucks and clamshells  Pain Ratin/10      Plan: Continue per plan of care  Progress treatment as tolerated  normal...